# Patient Record
Sex: FEMALE | Race: OTHER | NOT HISPANIC OR LATINO | Employment: FULL TIME | URBAN - NONMETROPOLITAN AREA
[De-identification: names, ages, dates, MRNs, and addresses within clinical notes are randomized per-mention and may not be internally consistent; named-entity substitution may affect disease eponyms.]

---

## 2023-02-27 ENCOUNTER — TELEPHONE (OUTPATIENT)
Dept: OBSTETRICS AND GYNECOLOGY | Facility: CLINIC | Age: 23
End: 2023-02-27

## 2023-02-27 NOTE — TELEPHONE ENCOUNTER
----- Message from Ricardo Meier sent at 2/27/2023  1:21 PM CST -----  Regarding: [employer] Bill  Pt received a bill from [employer]. Has been trying to get it taken care of for a few weeks now. Would like to speak with a nurse. [employer] customer service does not speak english, pt is unable to communicate effectively,